# Patient Record
Sex: MALE | Race: BLACK OR AFRICAN AMERICAN | Employment: UNEMPLOYED | URBAN - METROPOLITAN AREA
[De-identification: names, ages, dates, MRNs, and addresses within clinical notes are randomized per-mention and may not be internally consistent; named-entity substitution may affect disease eponyms.]

---
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
## 2024-06-13 ENCOUNTER — OFFICE VISIT (OUTPATIENT)
Dept: URBAN - METROPOLITAN AREA CLINIC 105 | Facility: CLINIC | Age: 24
End: 2024-06-13
Payer: COMMERCIAL

## 2024-06-13 ENCOUNTER — DASHBOARD ENCOUNTERS (OUTPATIENT)
Age: 24
End: 2024-06-13

## 2024-06-13 VITALS
BODY MASS INDEX: 27.83 KG/M2 | HEIGHT: 70 IN | TEMPERATURE: 98 F | HEART RATE: 68 BPM | DIASTOLIC BLOOD PRESSURE: 74 MMHG | SYSTOLIC BLOOD PRESSURE: 121 MMHG | WEIGHT: 194.4 LBS

## 2024-06-13 DIAGNOSIS — K92.1 HEMATOCHEZIA: ICD-10-CM

## 2024-06-13 DIAGNOSIS — Z80.0 FAMILY HISTORY OF COLON CANCER: ICD-10-CM

## 2024-06-13 PROCEDURE — 99204 OFFICE O/P NEW MOD 45 MIN: CPT | Performed by: INTERNAL MEDICINE

## 2024-06-13 NOTE — PHYSICAL EXAM CONSTITUTIONAL:
well developed, well nourished , in no acute distress , ambulating without difficulty , normal communication ability
Low Acute Suicide Risk

## 2024-06-13 NOTE — HPI-TODAY'S VISIT:
The patient has a longstanding history of rectla bleeding for the last several months. The pt notes that he has had incrreased abdominal pain with fecal urgency. The pt notes that he has had increased symptoms of gas, bloating and fecal urgency. The pt denies melena, hematochezia. Stool size has changed as well. The pt denies weight loss and joint or muscle pain.   The patient's time of visit DOS is 45 minutes after reiview of the old records and opt notes.

## 2024-06-18 ENCOUNTER — CLAIMS CREATED FROM THE CLAIM WINDOW (OUTPATIENT)
Dept: URBAN - METROPOLITAN AREA CLINIC 4 | Facility: CLINIC | Age: 24
End: 2024-06-18
Payer: COMMERCIAL

## 2024-06-18 ENCOUNTER — OFFICE VISIT (OUTPATIENT)
Dept: URBAN - METROPOLITAN AREA SURGERY CENTER 16 | Facility: SURGERY CENTER | Age: 24
End: 2024-06-18
Payer: COMMERCIAL

## 2024-06-18 DIAGNOSIS — K92.1 ACUTE MELENA: ICD-10-CM

## 2024-06-18 DIAGNOSIS — K64.8 HEMORRHOIDS, INTERNAL: ICD-10-CM

## 2024-06-18 DIAGNOSIS — K92.1 HEMATOCHEZIA: ICD-10-CM

## 2024-06-18 DIAGNOSIS — K63.89 OTHER SPECIFIED DISEASES OF INTESTINE: ICD-10-CM

## 2024-06-18 DIAGNOSIS — Z80.0 FAMILY HISTORY OF COLON CANCER: ICD-10-CM

## 2024-06-18 DIAGNOSIS — K62.5 RECTAL BLEEDING: ICD-10-CM

## 2024-06-18 PROCEDURE — 45380 COLONOSCOPY AND BIOPSY: CPT | Performed by: INTERNAL MEDICINE

## 2024-06-18 PROCEDURE — 00811 ANES LWR INTST NDSC NOS: CPT | Performed by: ANESTHESIOLOGY

## 2024-06-18 PROCEDURE — 88305 TISSUE EXAM BY PATHOLOGIST: CPT | Performed by: PATHOLOGY

## 2024-06-18 PROCEDURE — 00811 ANES LWR INTST NDSC NOS: CPT | Performed by: NURSE ANESTHETIST, CERTIFIED REGISTERED

## 2024-06-19 ENCOUNTER — APPOINTMENT (RX ONLY)
Age: 24
Setting detail: DERMATOLOGY
End: 2024-06-19

## 2024-06-19 DIAGNOSIS — D22 MELANOCYTIC NEVI: ICD-10-CM

## 2024-06-19 DIAGNOSIS — L64.8 OTHER ANDROGENIC ALOPECIA: ICD-10-CM

## 2024-06-19 PROBLEM — D22.5 MELANOCYTIC NEVI OF TRUNK: Status: ACTIVE | Noted: 2024-06-19

## 2024-06-19 PROCEDURE — 99203 OFFICE O/P NEW LOW 30 MIN: CPT

## 2024-06-19 PROCEDURE — ? PRESCRIPTION MEDICATION MANAGEMENT

## 2024-06-19 PROCEDURE — ? COUNSELING

## 2024-06-19 PROCEDURE — ? MEDICATION COUNSELING

## 2024-06-19 ASSESSMENT — LOCATION SIMPLE DESCRIPTION DERM
LOCATION SIMPLE: UPPER BACK
LOCATION SIMPLE: LEFT SCALP

## 2024-06-19 ASSESSMENT — LOCATION DETAILED DESCRIPTION DERM
LOCATION DETAILED: LEFT MEDIAL FRONTAL SCALP
LOCATION DETAILED: INFERIOR THORACIC SPINE

## 2024-06-19 ASSESSMENT — LOCATION ZONE DERM
LOCATION ZONE: SCALP
LOCATION ZONE: TRUNK

## 2024-06-25 LAB
A/G RATIO: 1.5
ABSOLUTE BASOPHILS: 32
ABSOLUTE EOSINOPHILS: 80
ABSOLUTE LYMPHOCYTES: 1472
ABSOLUTE MONOCYTES: 400
ABSOLUTE NEUTROPHILS: 2016
ALBUMIN: 4.3
ALKALINE PHOSPHATASE: 82
ALT (SGPT): 23
ANCA SCREEN: NEGATIVE
AST (SGOT): 24
BASOPHILS: 0.8
BILIRUBIN, TOTAL: 0.6
BUN/CREATININE RATIO: 10
BUN: 14
C-REACTIVE PROTEIN, QUANT: <3
CALCIUM: 9.7
CARBON DIOXIDE, TOTAL: 30
CHLORIDE: 104
CREATININE: 1.38
EGFR: 75
EOSINOPHILS: 2
GASCA: 27.2
GLOBULIN, TOTAL: 2.9
GLUCOSE: 59
HEMATOCRIT: 46.9
HEMOGLOBIN: 16.2
LYMPHOCYTES: 36.8
MCH: 31.2
MCHC: 34.5
MCV: 90.4
MONOCYTES: 10
MPV: 10.8
MYELOPEROXIDASE ANTIBODY: <1
NEUTROPHILS: 50.4
PLATELET COUNT: 237
POTASSIUM: 4.7
PROTEIN, TOTAL: 7.2
PROTEINASE-3 ANTIBODY: <1
RDW: 12.1
RED BLOOD CELL COUNT: 5.19
SACCHAROMYCES CEREVISIAE AB (ASCA) (IGA): 15.5
SODIUM: 140
WHITE BLOOD CELL COUNT: 4

## 2024-07-17 ENCOUNTER — WEB ENCOUNTER (OUTPATIENT)
Dept: URBAN - METROPOLITAN AREA CLINIC 105 | Facility: CLINIC | Age: 24
End: 2024-07-17

## 2024-07-22 ENCOUNTER — OFFICE VISIT (OUTPATIENT)
Dept: URBAN - METROPOLITAN AREA TELEHEALTH 2 | Facility: TELEHEALTH | Age: 24
End: 2024-07-22
Payer: COMMERCIAL

## 2024-07-22 DIAGNOSIS — R79.89 ELEVATED SERUM CREATININE: ICD-10-CM

## 2024-07-22 DIAGNOSIS — R10.33 PERIUMBILICAL ABDOMINAL PAIN: ICD-10-CM

## 2024-07-22 PROCEDURE — 99214 OFFICE O/P EST MOD 30 MIN: CPT | Performed by: INTERNAL MEDICINE

## 2024-07-22 NOTE — PHYSICAL EXAM PSYCH:
Third and final attempt to contact patient regarding supportive counseling.  Left a voice message for patient to call back if she is interested in connecting with supportive counseling.  Will inform referring provider.   normal mood with appropriate affect

## 2024-07-22 NOTE — HPI-TODAY'S VISIT:
The patient has a history of abdominal pain and rectal bleeding who presents for f/u office visit. The pt is doing well and he is s/p colon 6/18/24 is neg as bx's of TI and colon is negative. The pt's labs + for elevated creatinine of 1.38 and remainder of labs are o/w. The colon bx's of ileum and colon bx's are negative  The pt is currently not available and I am talking with pt's mother. The pt 's mother has voiced a great deal of complaints at this time. I did speak with pt's mother who is very demanding. I did revewi with pt's mother re: elevated creatinine and will repeat labs.   The pt;s karen of visit is 35 minutes after review of the old records and op notes.

## 2024-07-29 ENCOUNTER — TELEPHONE ENCOUNTER (OUTPATIENT)
Dept: URBAN - METROPOLITAN AREA CLINIC 105 | Facility: CLINIC | Age: 24
End: 2024-07-29

## 2024-08-12 ENCOUNTER — OFFICE VISIT (OUTPATIENT)
Dept: URBAN - METROPOLITAN AREA TELEHEALTH 2 | Facility: TELEHEALTH | Age: 24
End: 2024-08-12
Payer: COMMERCIAL

## 2024-08-12 DIAGNOSIS — K59.01 SLOW TRANSIT CONSTIPATION: ICD-10-CM

## 2024-08-12 DIAGNOSIS — K64.1 GRADE II HEMORRHOIDS: ICD-10-CM

## 2024-08-12 DIAGNOSIS — K92.1 HEMATOCHEZIA: ICD-10-CM

## 2024-08-12 PROBLEM — 35298007: Status: ACTIVE | Noted: 2024-08-12

## 2024-08-12 PROCEDURE — 99214 OFFICE O/P EST MOD 30 MIN: CPT | Performed by: INTERNAL MEDICINE

## 2024-08-12 NOTE — HPI-TODAY'S VISIT:
The patient has a history of constipation and intermittent rectal bleedkng and notse that he is doing well with regular BM's and he notes that the rectal bleedkng has resolved, Pt had labs + elevated creatientin at 1.38 and repeat labs were ordered but pt has not had the f/u labs done. He is eating much better and notes that he is eating a diet high in fiber and alot more fruits and vegetables. The pt is s/p colon 6/2024 whicn was normal.  The pt's time of visit DOS is 35 minutes after review of the old records and op notes.

## 2024-08-29 ENCOUNTER — OFFICE VISIT (OUTPATIENT)
Dept: URBAN - METROPOLITAN AREA CLINIC 105 | Facility: CLINIC | Age: 24
End: 2024-08-29